# Patient Record
Sex: MALE | Race: BLACK OR AFRICAN AMERICAN | Employment: FULL TIME | URBAN - NONMETROPOLITAN AREA
[De-identification: names, ages, dates, MRNs, and addresses within clinical notes are randomized per-mention and may not be internally consistent; named-entity substitution may affect disease eponyms.]

---

## 2017-06-02 ENCOUNTER — HOSPITAL ENCOUNTER (EMERGENCY)
Age: 37
Discharge: HOME | End: 2017-06-02
Attending: INTERNAL MEDICINE
Payer: COMMERCIAL

## 2017-06-02 VITALS
RESPIRATION RATE: 16 BRPM | HEART RATE: 81 BPM | OXYGEN SATURATION: 100 % | HEIGHT: 73 IN | TEMPERATURE: 97.7 F | DIASTOLIC BLOOD PRESSURE: 78 MMHG | WEIGHT: 230 LBS | SYSTOLIC BLOOD PRESSURE: 139 MMHG | BODY MASS INDEX: 30.48 KG/M2

## 2017-06-02 DIAGNOSIS — K52.9 AGE (ACUTE GASTROENTERITIS): Primary | ICD-10-CM

## 2017-06-02 PROCEDURE — 99283 EMERGENCY DEPT VISIT LOW MDM: CPT

## 2017-06-02 PROCEDURE — 6360000002 HC RX W HCPCS: Performed by: INTERNAL MEDICINE

## 2017-06-02 RX ORDER — LISINOPRIL 20 MG/1
20 TABLET ORAL DAILY
Status: ON HOLD | COMMUNITY
End: 2017-12-24 | Stop reason: HOSPADM

## 2017-06-02 RX ORDER — ONDANSETRON 4 MG/1
2 TABLET, ORALLY DISINTEGRATING ORAL EVERY 4 HOURS PRN
Qty: 15 TABLET | Refills: 0 | Status: SHIPPED | OUTPATIENT
Start: 2017-06-02 | End: 2017-12-23

## 2017-06-02 RX ORDER — HYDROCHLOROTHIAZIDE 12.5 MG/1
12.5 CAPSULE, GELATIN COATED ORAL DAILY
Status: ON HOLD | COMMUNITY
End: 2017-12-24 | Stop reason: HOSPADM

## 2017-06-02 RX ORDER — ONDANSETRON 4 MG/1
4 TABLET, ORALLY DISINTEGRATING ORAL ONCE
Status: COMPLETED | OUTPATIENT
Start: 2017-06-02 | End: 2017-06-02

## 2017-06-02 RX ORDER — SIMVASTATIN 10 MG
10 TABLET ORAL NIGHTLY
Status: ON HOLD | COMMUNITY
End: 2017-12-24 | Stop reason: HOSPADM

## 2017-06-02 RX ADMIN — ONDANSETRON 4 MG: 4 TABLET, ORALLY DISINTEGRATING ORAL at 07:39

## 2017-06-02 ASSESSMENT — ENCOUNTER SYMPTOMS
CHEST TIGHTNESS: 0
DIARRHEA: 1
EYE DISCHARGE: 0
SORE THROAT: 0
ABDOMINAL PAIN: 0
BACK PAIN: 0
EYE PAIN: 0
SHORTNESS OF BREATH: 0
VOMITING: 0
COUGH: 0
NAUSEA: 1

## 2017-06-02 ASSESSMENT — PAIN SCALES - GENERAL: PAINLEVEL_OUTOF10: 5

## 2017-06-02 ASSESSMENT — PAIN DESCRIPTION - PAIN TYPE: TYPE: ACUTE PAIN

## 2017-06-02 ASSESSMENT — PAIN DESCRIPTION - LOCATION: LOCATION: ABDOMEN

## 2017-06-02 ASSESSMENT — PAIN DESCRIPTION - DESCRIPTORS: DESCRIPTORS: CRAMPING

## 2017-06-02 ASSESSMENT — PAIN DESCRIPTION - FREQUENCY: FREQUENCY: CONTINUOUS

## 2017-12-23 PROBLEM — I48.91 ATRIAL FIBRILLATION (HCC): Status: ACTIVE | Noted: 2017-12-23

## 2017-12-26 ENCOUNTER — TELEPHONE (OUTPATIENT)
Dept: CARDIOLOGY | Age: 37
End: 2017-12-26

## 2017-12-26 NOTE — TELEPHONE ENCOUNTER
Spoke with pt's wife and informed her of the pt's appointment on 1/26/18 at 815. Also, I informed her that Dr. Barrera Ulrich would like him to get an appointment with Dr. Kelsea Weiss to have a primary care provider.

## 2022-09-10 ENCOUNTER — APPOINTMENT (OUTPATIENT)
Dept: GENERAL RADIOLOGY | Age: 42
DRG: 201 | End: 2022-09-10
Payer: MEDICAID

## 2022-09-10 ENCOUNTER — APPOINTMENT (OUTPATIENT)
Dept: CT IMAGING | Age: 42
DRG: 201 | End: 2022-09-10
Payer: MEDICAID

## 2022-09-10 ENCOUNTER — HOSPITAL ENCOUNTER (INPATIENT)
Age: 42
LOS: 1 days | Discharge: HOME | DRG: 201 | End: 2022-09-12
Attending: EMERGENCY MEDICINE | Admitting: STUDENT IN AN ORGANIZED HEALTH CARE EDUCATION/TRAINING PROGRAM
Payer: MEDICAID

## 2022-09-10 DIAGNOSIS — I48.92 ATRIAL FLUTTER WITH RAPID VENTRICULAR RESPONSE (HCC): ICD-10-CM

## 2022-09-10 DIAGNOSIS — R06.00 DYSPNEA AND RESPIRATORY ABNORMALITIES: Primary | ICD-10-CM

## 2022-09-10 DIAGNOSIS — R06.89 DYSPNEA AND RESPIRATORY ABNORMALITIES: Primary | ICD-10-CM

## 2022-09-10 LAB
A/G RATIO: 1.3 (ref 1.1–2.2)
ALBUMIN SERPL-MCNC: 4.3 G/DL (ref 3.4–5)
ALP BLD-CCNC: 63 U/L (ref 40–129)
ALT SERPL-CCNC: 26 U/L (ref 10–40)
ANION GAP SERPL CALCULATED.3IONS-SCNC: 12 MMOL/L (ref 3–16)
AST SERPL-CCNC: 21 U/L (ref 15–37)
BASOPHILS ABSOLUTE: 0.1 K/UL (ref 0–0.2)
BASOPHILS RELATIVE PERCENT: 1.2 %
BILIRUB SERPL-MCNC: 0.4 MG/DL (ref 0–1)
BUN BLDV-MCNC: 15 MG/DL (ref 7–20)
CALCIUM SERPL-MCNC: 9.6 MG/DL (ref 8.3–10.6)
CHLORIDE BLD-SCNC: 106 MMOL/L (ref 99–110)
CO2: 23 MMOL/L (ref 21–32)
CREAT SERPL-MCNC: 1.4 MG/DL (ref 0.9–1.3)
EOSINOPHILS ABSOLUTE: 0.1 K/UL (ref 0–0.6)
EOSINOPHILS RELATIVE PERCENT: 1.7 %
GFR AFRICAN AMERICAN: >60
GFR NON-AFRICAN AMERICAN: 55
GLUCOSE BLD-MCNC: 97 MG/DL (ref 70–99)
HCT VFR BLD CALC: 46.5 % (ref 40.5–52.5)
HEMOGLOBIN: 15.4 G/DL (ref 13.5–17.5)
INR BLD: 0.96 (ref 0.87–1.14)
LYMPHOCYTES ABSOLUTE: 3.7 K/UL (ref 1–5.1)
LYMPHOCYTES RELATIVE PERCENT: 45.4 %
MCH RBC QN AUTO: 26.9 PG (ref 26–34)
MCHC RBC AUTO-ENTMCNC: 33.2 G/DL (ref 31–36)
MCV RBC AUTO: 81 FL (ref 80–100)
MONOCYTES ABSOLUTE: 0.6 K/UL (ref 0–1.3)
MONOCYTES RELATIVE PERCENT: 7.5 %
NEUTROPHILS ABSOLUTE: 3.6 K/UL (ref 1.7–7.7)
NEUTROPHILS RELATIVE PERCENT: 44.2 %
PDW BLD-RTO: 15.2 % (ref 12.4–15.4)
PLATELET # BLD: 127 K/UL (ref 135–450)
PMV BLD AUTO: 9.2 FL (ref 5–10.5)
POTASSIUM REFLEX MAGNESIUM: 3.9 MMOL/L (ref 3.5–5.1)
PRO-BNP: 10 PG/ML (ref 0–124)
PROTHROMBIN TIME: 12.7 SEC (ref 11.7–14.5)
RBC # BLD: 5.74 M/UL (ref 4.2–5.9)
SODIUM BLD-SCNC: 141 MMOL/L (ref 136–145)
TOTAL PROTEIN: 7.6 G/DL (ref 6.4–8.2)
TROPONIN: <0.01 NG/ML
TSH REFLEX FT4: 1.7 UIU/ML (ref 0.27–4.2)
WBC # BLD: 8.1 K/UL (ref 4–11)

## 2022-09-10 PROCEDURE — 96375 TX/PRO/DX INJ NEW DRUG ADDON: CPT

## 2022-09-10 PROCEDURE — 71260 CT THORAX DX C+: CPT | Performed by: NURSE PRACTITIONER

## 2022-09-10 PROCEDURE — 71046 X-RAY EXAM CHEST 2 VIEWS: CPT

## 2022-09-10 PROCEDURE — 2580000003 HC RX 258: Performed by: NURSE PRACTITIONER

## 2022-09-10 PROCEDURE — 96361 HYDRATE IV INFUSION ADD-ON: CPT

## 2022-09-10 PROCEDURE — 84443 ASSAY THYROID STIM HORMONE: CPT

## 2022-09-10 PROCEDURE — 6360000004 HC RX CONTRAST MEDICATION: Performed by: EMERGENCY MEDICINE

## 2022-09-10 PROCEDURE — 99285 EMERGENCY DEPT VISIT HI MDM: CPT

## 2022-09-10 PROCEDURE — 93005 ELECTROCARDIOGRAM TRACING: CPT | Performed by: EMERGENCY MEDICINE

## 2022-09-10 PROCEDURE — 80053 COMPREHEN METABOLIC PANEL: CPT

## 2022-09-10 PROCEDURE — 2500000003 HC RX 250 WO HCPCS: Performed by: NURSE PRACTITIONER

## 2022-09-10 PROCEDURE — 84484 ASSAY OF TROPONIN QUANT: CPT

## 2022-09-10 PROCEDURE — 83880 ASSAY OF NATRIURETIC PEPTIDE: CPT

## 2022-09-10 PROCEDURE — 85025 COMPLETE CBC W/AUTO DIFF WBC: CPT

## 2022-09-10 PROCEDURE — 96376 TX/PRO/DX INJ SAME DRUG ADON: CPT

## 2022-09-10 PROCEDURE — 85610 PROTHROMBIN TIME: CPT

## 2022-09-10 PROCEDURE — 96374 THER/PROPH/DIAG INJ IV PUSH: CPT

## 2022-09-10 RX ORDER — HYDROCHLOROTHIAZIDE 12.5 MG/1
12.5 TABLET ORAL DAILY
Status: ON HOLD | COMMUNITY
End: 2022-09-11

## 2022-09-10 RX ORDER — DILTIAZEM HYDROCHLORIDE 5 MG/ML
10 INJECTION INTRAVENOUS ONCE
Status: COMPLETED | OUTPATIENT
Start: 2022-09-10 | End: 2022-09-10

## 2022-09-10 RX ORDER — 0.9 % SODIUM CHLORIDE 0.9 %
1000 INTRAVENOUS SOLUTION INTRAVENOUS ONCE
Status: COMPLETED | OUTPATIENT
Start: 2022-09-10 | End: 2022-09-11

## 2022-09-10 RX ADMIN — DILTIAZEM HYDROCHLORIDE 10 MG: 5 INJECTION, SOLUTION INTRAVENOUS at 22:54

## 2022-09-10 RX ADMIN — IOPAMIDOL 75 ML: 755 INJECTION, SOLUTION INTRAVENOUS at 23:46

## 2022-09-10 RX ADMIN — DILTIAZEM HYDROCHLORIDE 10 MG: 5 INJECTION, SOLUTION INTRAVENOUS at 23:43

## 2022-09-10 RX ADMIN — SODIUM CHLORIDE 1000 ML: 9 INJECTION, SOLUTION INTRAVENOUS at 22:53

## 2022-09-10 ASSESSMENT — ENCOUNTER SYMPTOMS
DIARRHEA: 0
SORE THROAT: 0
BACK PAIN: 0
VOMITING: 0
WHEEZING: 0
SHORTNESS OF BREATH: 1
COUGH: 0
NAUSEA: 0
CHEST TIGHTNESS: 0
ABDOMINAL PAIN: 0
EYE PAIN: 0

## 2022-09-10 ASSESSMENT — PAIN - FUNCTIONAL ASSESSMENT: PAIN_FUNCTIONAL_ASSESSMENT: NONE - DENIES PAIN

## 2022-09-11 PROBLEM — I48.91 ATRIAL FIBRILLATION WITH RAPID VENTRICULAR RESPONSE (HCC): Status: ACTIVE | Noted: 2022-09-11

## 2022-09-11 PROBLEM — R29.818 SUSPECTED SLEEP APNEA: Status: ACTIVE | Noted: 2022-09-11

## 2022-09-11 PROBLEM — Z72.0 NICOTINE ABUSE: Status: ACTIVE | Noted: 2022-09-11

## 2022-09-11 LAB
A/G RATIO: 1.8 (ref 1.1–2.2)
ALBUMIN SERPL-MCNC: 3 G/DL (ref 3.4–5)
ALBUMIN SERPL-MCNC: 3.2 G/DL (ref 3.4–5)
ALP BLD-CCNC: 43 U/L (ref 40–129)
ALT SERPL-CCNC: 20 U/L (ref 10–40)
ANION GAP SERPL CALCULATED.3IONS-SCNC: 11 MMOL/L (ref 3–16)
ANION GAP SERPL CALCULATED.3IONS-SCNC: 9 MMOL/L (ref 3–16)
AST SERPL-CCNC: 14 U/L (ref 15–37)
BASOPHILS ABSOLUTE: 0 K/UL (ref 0–0.2)
BASOPHILS RELATIVE PERCENT: 0.8 %
BILIRUB SERPL-MCNC: 0.4 MG/DL (ref 0–1)
BUN BLDV-MCNC: 11 MG/DL (ref 7–20)
BUN BLDV-MCNC: 9 MG/DL (ref 7–20)
CALCIUM SERPL-MCNC: 6.8 MG/DL (ref 8.3–10.6)
CALCIUM SERPL-MCNC: 6.9 MG/DL (ref 8.3–10.6)
CHLORIDE BLD-SCNC: 110 MMOL/L (ref 99–110)
CHLORIDE BLD-SCNC: 113 MMOL/L (ref 99–110)
CO2: 17 MMOL/L (ref 21–32)
CO2: 17 MMOL/L (ref 21–32)
CREAT SERPL-MCNC: 0.7 MG/DL (ref 0.9–1.3)
CREAT SERPL-MCNC: 0.7 MG/DL (ref 0.9–1.3)
EKG ATRIAL RATE: 300 BPM
EKG DIAGNOSIS: NORMAL
EKG P AXIS: 250 DEGREES
EKG Q-T INTERVAL: 240 MS
EKG QRS DURATION: 82 MS
EKG QTC CALCULATION (BAZETT): 388 MS
EKG R AXIS: 68 DEGREES
EKG T AXIS: 2 DEGREES
EKG VENTRICULAR RATE: 157 BPM
EOSINOPHILS ABSOLUTE: 0.1 K/UL (ref 0–0.6)
EOSINOPHILS RELATIVE PERCENT: 1.5 %
GFR AFRICAN AMERICAN: >60
GFR AFRICAN AMERICAN: >60
GFR NON-AFRICAN AMERICAN: >60
GFR NON-AFRICAN AMERICAN: >60
GLUCOSE BLD-MCNC: 111 MG/DL (ref 70–99)
GLUCOSE BLD-MCNC: 117 MG/DL (ref 70–99)
HCT VFR BLD CALC: 40.8 % (ref 40.5–52.5)
HEMOGLOBIN: 13.6 G/DL (ref 13.5–17.5)
LYMPHOCYTES ABSOLUTE: 3 K/UL (ref 1–5.1)
LYMPHOCYTES RELATIVE PERCENT: 47.8 %
MAGNESIUM: 1.5 MG/DL (ref 1.8–2.4)
MAGNESIUM: 1.5 MG/DL (ref 1.8–2.4)
MCH RBC QN AUTO: 27.2 PG (ref 26–34)
MCHC RBC AUTO-ENTMCNC: 33.2 G/DL (ref 31–36)
MCV RBC AUTO: 81.8 FL (ref 80–100)
MONOCYTES ABSOLUTE: 0.5 K/UL (ref 0–1.3)
MONOCYTES RELATIVE PERCENT: 7.5 %
NEUTROPHILS ABSOLUTE: 2.6 K/UL (ref 1.7–7.7)
NEUTROPHILS RELATIVE PERCENT: 42.4 %
PDW BLD-RTO: 15.4 % (ref 12.4–15.4)
PHOSPHORUS: 2.2 MG/DL (ref 2.5–4.9)
PLATELET # BLD: 115 K/UL (ref 135–450)
PLATELET SLIDE REVIEW: ABNORMAL
PMV BLD AUTO: 10.2 FL (ref 5–10.5)
POTASSIUM REFLEX MAGNESIUM: 3.3 MMOL/L (ref 3.5–5.1)
POTASSIUM SERPL-SCNC: 3.4 MMOL/L (ref 3.5–5.1)
RBC # BLD: 4.99 M/UL (ref 4.2–5.9)
SLIDE REVIEW: ABNORMAL
SODIUM BLD-SCNC: 136 MMOL/L (ref 136–145)
SODIUM BLD-SCNC: 141 MMOL/L (ref 136–145)
TOTAL PROTEIN: 5 G/DL (ref 6.4–8.2)
WBC # BLD: 6.2 K/UL (ref 4–11)

## 2022-09-11 PROCEDURE — 85025 COMPLETE CBC W/AUTO DIFF WBC: CPT

## 2022-09-11 PROCEDURE — 80053 COMPREHEN METABOLIC PANEL: CPT

## 2022-09-11 PROCEDURE — 6370000000 HC RX 637 (ALT 250 FOR IP): Performed by: STUDENT IN AN ORGANIZED HEALTH CARE EDUCATION/TRAINING PROGRAM

## 2022-09-11 PROCEDURE — 2580000003 HC RX 258: Performed by: STUDENT IN AN ORGANIZED HEALTH CARE EDUCATION/TRAINING PROGRAM

## 2022-09-11 PROCEDURE — 93010 ELECTROCARDIOGRAM REPORT: CPT | Performed by: INTERNAL MEDICINE

## 2022-09-11 PROCEDURE — 36415 COLL VENOUS BLD VENIPUNCTURE: CPT

## 2022-09-11 PROCEDURE — 2060000000 HC ICU INTERMEDIATE R&B

## 2022-09-11 PROCEDURE — 2500000003 HC RX 250 WO HCPCS: Performed by: NURSE PRACTITIONER

## 2022-09-11 PROCEDURE — 99255 IP/OBS CONSLTJ NEW/EST HI 80: CPT | Performed by: INTERNAL MEDICINE

## 2022-09-11 PROCEDURE — 83735 ASSAY OF MAGNESIUM: CPT

## 2022-09-11 PROCEDURE — 2500000003 HC RX 250 WO HCPCS: Performed by: STUDENT IN AN ORGANIZED HEALTH CARE EDUCATION/TRAINING PROGRAM

## 2022-09-11 PROCEDURE — 6370000000 HC RX 637 (ALT 250 FOR IP): Performed by: INTERNAL MEDICINE

## 2022-09-11 RX ORDER — ENOXAPARIN SODIUM 100 MG/ML
30 INJECTION SUBCUTANEOUS 2 TIMES DAILY
Status: DISCONTINUED | OUTPATIENT
Start: 2022-09-11 | End: 2022-09-11

## 2022-09-11 RX ORDER — ENOXAPARIN SODIUM 100 MG/ML
40 INJECTION SUBCUTANEOUS DAILY
Status: DISCONTINUED | OUTPATIENT
Start: 2022-09-11 | End: 2022-09-11 | Stop reason: DRUGHIGH

## 2022-09-11 RX ORDER — SODIUM CHLORIDE 9 MG/ML
INJECTION, SOLUTION INTRAVENOUS CONTINUOUS
Status: DISCONTINUED | OUTPATIENT
Start: 2022-09-11 | End: 2022-09-11

## 2022-09-11 RX ORDER — ACETAMINOPHEN 650 MG/1
650 SUPPOSITORY RECTAL EVERY 6 HOURS PRN
Status: DISCONTINUED | OUTPATIENT
Start: 2022-09-11 | End: 2022-09-12 | Stop reason: HOSPADM

## 2022-09-11 RX ORDER — DILTIAZEM HYDROCHLORIDE 240 MG/1
240 CAPSULE, COATED, EXTENDED RELEASE ORAL DAILY
Status: DISCONTINUED | OUTPATIENT
Start: 2022-09-11 | End: 2022-09-12 | Stop reason: HOSPADM

## 2022-09-11 RX ORDER — METOPROLOL TARTRATE 5 MG/5ML
5 INJECTION INTRAVENOUS ONCE
Status: COMPLETED | OUTPATIENT
Start: 2022-09-11 | End: 2022-09-11

## 2022-09-11 RX ORDER — SODIUM CHLORIDE 9 MG/ML
INJECTION, SOLUTION INTRAVENOUS PRN
Status: DISCONTINUED | OUTPATIENT
Start: 2022-09-11 | End: 2022-09-12 | Stop reason: HOSPADM

## 2022-09-11 RX ORDER — SODIUM CHLORIDE 0.9 % (FLUSH) 0.9 %
5-40 SYRINGE (ML) INJECTION PRN
Status: DISCONTINUED | OUTPATIENT
Start: 2022-09-11 | End: 2022-09-12 | Stop reason: HOSPADM

## 2022-09-11 RX ORDER — ONDANSETRON 2 MG/ML
4 INJECTION INTRAMUSCULAR; INTRAVENOUS EVERY 6 HOURS PRN
Status: DISCONTINUED | OUTPATIENT
Start: 2022-09-11 | End: 2022-09-12 | Stop reason: HOSPADM

## 2022-09-11 RX ORDER — SODIUM CHLORIDE 0.9 % (FLUSH) 0.9 %
5-40 SYRINGE (ML) INJECTION EVERY 12 HOURS SCHEDULED
Status: DISCONTINUED | OUTPATIENT
Start: 2022-09-11 | End: 2022-09-12 | Stop reason: HOSPADM

## 2022-09-11 RX ORDER — ASPIRIN 81 MG/1
81 TABLET ORAL DAILY
Status: DISCONTINUED | OUTPATIENT
Start: 2022-09-11 | End: 2022-09-11

## 2022-09-11 RX ORDER — LISINOPRIL 10 MG/1
10 TABLET ORAL DAILY
Status: DISCONTINUED | OUTPATIENT
Start: 2022-09-11 | End: 2022-09-12 | Stop reason: HOSPADM

## 2022-09-11 RX ORDER — ACETAMINOPHEN 325 MG/1
650 TABLET ORAL EVERY 6 HOURS PRN
Status: DISCONTINUED | OUTPATIENT
Start: 2022-09-11 | End: 2022-09-12 | Stop reason: HOSPADM

## 2022-09-11 RX ADMIN — SODIUM CHLORIDE: 9 INJECTION, SOLUTION INTRAVENOUS at 02:30

## 2022-09-11 RX ADMIN — DILTIAZEM HYDROCHLORIDE 240 MG: 240 CAPSULE, COATED, EXTENDED RELEASE ORAL at 13:31

## 2022-09-11 RX ADMIN — METOPROLOL TARTRATE 12.5 MG: 25 TABLET, FILM COATED ORAL at 02:22

## 2022-09-11 RX ADMIN — METOPROLOL TARTRATE 12.5 MG: 25 TABLET, FILM COATED ORAL at 07:55

## 2022-09-11 RX ADMIN — DILTIAZEM HYDROCHLORIDE 2.5 MG/HR: 5 INJECTION INTRAVENOUS at 01:35

## 2022-09-11 RX ADMIN — LISINOPRIL 10 MG: 10 TABLET ORAL at 07:55

## 2022-09-11 RX ADMIN — Medication 10 ML: at 21:35

## 2022-09-11 RX ADMIN — APIXABAN 5 MG: 5 TABLET, FILM COATED ORAL at 21:35

## 2022-09-11 RX ADMIN — METOPROLOL TARTRATE 5 MG: 5 INJECTION INTRAVENOUS at 00:21

## 2022-09-11 RX ADMIN — ASPIRIN 81 MG: 81 TABLET, COATED ORAL at 07:55

## 2022-09-11 RX ADMIN — APIXABAN 5 MG: 5 TABLET, FILM COATED ORAL at 13:31

## 2022-09-11 ASSESSMENT — PAIN - FUNCTIONAL ASSESSMENT
PAIN_FUNCTIONAL_ASSESSMENT: NONE - DENIES PAIN
PAIN_FUNCTIONAL_ASSESSMENT: NONE - DENIES PAIN

## 2022-09-11 ASSESSMENT — PAIN SCALES - GENERAL
PAINLEVEL_OUTOF10: 0

## 2022-09-11 NOTE — CONSULTS
Referring Physician: Dr. Ortega Camejo  Reason for Consultation: \"Recurrence atrial fibrillation RVR\"  Chief Complaint: Palpitations      Subjective:   History of Present Illness:  Mariposa Glass is a 42 y.o. patient who presented to the hospital with complaints of palpitations. The patient has a known history of paroxysmal atrial fibrillation/flutter since 1/2018 per his report. He had the sudden onset of rapid palpitations with associated dyspnea and generalized sensation of feeling unwell. He knew instantly that he was back in atrial fibrillation. Oftentimes this sensation resolves without any particular intervention but was not improving and thus he sought medical attention. The ECG shows atrial flutter. On telemetry, appears to be more consistent with atrial fibrillation. Regardless he was started on a diltiazem drip and his heart rate improved. The patient is very quickly aware of when the heart rate is elevated but well controlled he generally says he feels okay. He denies associated chest pains. His wife is present bedside. He acknowledges that he likely has sleep apnea but has never been tested. In general, he denies exertional chest pain/pressure, PND, orthopnea, and lower extremity edema. Past Medical History:   has a past medical history of Atrial fibrillation ([address] Hyperlipidemia, and Hypertension. Surgical History:  Denies prior cardiac surgery. Social History:   reports that he has been smoking cigarettes. He has been smoking an average of .5 packs per day. He has never used smokeless tobacco. He reports current alcohol use. He reports current drug use. Drug: Marijuana Champ Cue). Family History:  family history includes High Blood Pressure in his father and mother. He was adopted. Home Medications:  Were reviewed and are listed in nursing record and/or below  Prior to Admission medications    Medication Sig Start Date End Date Taking?  Authorizing Provider   lisinopril (PRINIVIL;ZESTRIL) 10 MG tablet Take 1 tablet by mouth daily 7/14/18   RANJITH Guzman   aspirin 81 MG EC tablet Take 1 tablet by mouth daily 12/25/17   Falguni Franklin MD        CURRENT Medications:  dilTIAZem 125 mg in dextrose 5 % 125 mL infusion, Continuous  lisinopril (PRINIVIL;ZESTRIL) tablet 10 mg, Daily  aspirin EC tablet 81 mg, Daily  sodium chloride flush 0.9 % injection 5-40 mL, 2 times per day  sodium chloride flush 0.9 % injection 5-40 mL, PRN  0.9 % sodium chloride infusion, PRN  acetaminophen (TYLENOL) tablet 650 mg, Q6H PRN   Or  acetaminophen (TYLENOL) suppository 650 mg, Q6H PRN  perflutren lipid microspheres (DEFINITY) injection 1.65 mg, ONCE PRN  ondansetron (ZOFRAN) injection 4 mg, Q6H PRN  metoprolol tartrate (LOPRESSOR) tablet 12.5 mg, BID  enoxaparin Sodium (LOVENOX) injection 30 mg, BID  0.9 % sodium chloride infusion, Continuous    Allergies:  Bee venom and Pcn [penicillins]     Review of Systems:   Constitutional: no unanticipated weight loss. There's been no change in energy level, sleep pattern, or activity level. No fevers, chills. Eyes: No visual changes or diplopia. No scleral icterus. ENT: No Headaches, hearing loss or vertigo. No mouth sores or sore throat. Cardiovascular: as reviewed in HPI  Respiratory: No cough or wheezing, no sputum production. No hemoptysis. Gastrointestinal: No abdominal pain, appetite loss, blood in stools. No change in bowel or bladder habits. Genitourinary: No dysuria, trouble voiding, or hematuria. Musculoskeletal:  No gait disturbance, no joint complaints. Integumentary: No rash or pruritis. Neurological: No headache, diplopia, change in muscle strength, numbness or tingling. Psychiatric: No anxiety or depression. Endocrine: No temperature intolerance. No excessive thirst, fluid intake, or urination. No tremor. Hematologic/Lymphatic: No abnormal bruising or bleeding, blood clots or swollen lymph nodes.   Allergic/Immunologic: No nasal congestion or hives. Objective:   PHYSICAL EXAM:    Vitals:    09/11/22 1215   BP: 124/93   Pulse: 94   Resp: 17   Temp:    SpO2: 97    Weight: 253 lb 1.4 oz (114.8 kg)       General Appearance:  Alert, cooperative, no distress, appears stated age. Head:  Normocephalic, without obvious abnormality, atraumatic. Eyes:  Pupils equal and round. No scleral icterus. Mouth: Moist mucosa, no pharyngeal erythema. Nose: Nares normal. No drainage or sinus tenderness. Neck: Supple, symmetrical, trachea midline. No adenopathy. No tenderness/mass/nodules. No carotid bruit or elevated JVD. Lungs:   Clear to auscultation bilaterally, respirations unlabored. No wheeze, rales, or rhonchi. Chest Wall:  No tenderness or deformity. Heart:  Irrieg irreg with rate . S1/S2 normal. No murmur, rub, or gallop. Abdomen:   Soft, non-tender, bowel sounds active. Musculoskeletal: No muscle wasting or digital clubbing. Extremities: Extremities normal, atraumatic. No cyanosis or edema. Pulses: 2+ radial and carotid pulses, symmetric. Skin: No rashes or lesions. Pysch: Normal mood and affect. Alert and oriented x 4.    Neurologic: Normal gross motor and sensory exam.       Labs     CBC:   Lab Results   Component Value Date/Time    WBC 6.2 09/11/2022 11:01 AM    RBC 4.99 09/11/2022 11:01 AM    HGB 13.6 09/11/2022 11:01 AM    HCT 40.8 09/11/2022 11:01 AM    MCV 81.8 09/11/2022 11:01 AM    RDW 15.4 09/11/2022 11:01 AM     09/10/2022 10:40 PM     CMP:  Lab Results   Component Value Date/Time     09/11/2022 11:01 AM    K 3.4 09/11/2022 11:01 AM    K 3.9 09/10/2022 10:40 PM     09/11/2022 11:01 AM    CO2 17 09/11/2022 11:01 AM    BUN 11 09/11/2022 11:01 AM    CREATININE 0.7 09/11/2022 11:01 AM    GFRAA >60 09/11/2022 11:01 AM    AGRATIO 1.3 09/10/2022 10:40 PM    LABGLOM >60 09/11/2022 11:01 AM    GLUCOSE 111 09/11/2022 11:01 AM    PROT 7.6 09/10/2022 10:40 PM    CALCIUM 6.9 09/11/2022 11:01 AM BILITOT 0.4 09/10/2022 10:40 PM    ALKPHOS 63 09/10/2022 10:40 PM    AST 21 09/10/2022 10:40 PM    ALT 26 09/10/2022 10:40 PM     PT/INR:  No results found for: PTINR  HgBA1c:No results found for: LABA1C  Lab Results   Component Value Date    TROPONINI <0.01 09/10/2022       Cardiac Data     EKG: Personally interpreted. 9/10/2022. Atrial flutter predominantly in 2-1 AV block with premature or aberrantly conducted complexes. Echo: None on file. Telemetry: Personally interpreted. Atrial fibrillation/flutter. Assessment and Plan   1) Atrial fibrillation/flutter with a rapid ventricular response. Patient with known paroxysmal atrial fibrillation/flutter. [id number] score equals 1. Treatment options for atrial fibrillation/flutter discussed including rate control, risks and benefits of anticoagulation, antiarrhythmics, cardioversion, and possible ablation. Will transition from IV diltiazem to Cardizem CD. Discussed inpatient versus outpatient management. The patient is comfortable with outpatient follow-up. Start Eliquis 5 mg twice daily in anticipation of outpatient cardioversion. He seems more interested in pursuing an ablation. Will arrange outpatient consultation with the electrophysiologist and obtain an echocardiogram.    2) Essential hypertension. Controlled. Goal BP <130/80. Continue medical therapy. 3) Suspected sleep apnea. Patient's wife reports that he has episodes of apnea. Outpatient referral to sleep medicine for sleep study seems appropriate. 4) Nicotine Abuse. Encouraged smoking cessation but patient is in the contemplative stage. Overall, the problems requiring hospitalization are high in severity. Will sign off. Call with questions. Will arrange outpatient follow-up. Thank you for allowing us to participate in the care of Josemanuel Storm. Keri Arechiga.  [place]ams Monte, [address]  9/11/2022 12:44 PM

## 2022-09-11 NOTE — ED PROVIDER NOTES
[address] [facility]      Pt Name: Franck Hernandez  MRN: [id number]  T[birth year]gfneris 1980  Date of evaluation: 9/10/2022  [facility]  Chief Complaint   Patient presents with    Shortness of Breath     Patient is SOB when he was walking up the steps, hx of A fib and feels his heart is beating funny       I have fully participated in the care of Franck Hernandez and have had a face-to-face evaluation. I have reviewed and agree with all pertinent clinical information, and midlevel provider's history, and physical exam. I have also reviewed the labs, EKG, and imaging studies and treatment plan. I have also reviewed and agree with the medications, allergies and past medical history section for this [employer]. I agree with the diagnosis, and I concur. I wore personal protective equipment when I was in the room the entire time. This includes gloves, N95 mask, face shield, and a glove over my stethoscope for protection. Past Medical History:   Diagnosis Date    Hyperlipidemia     Hypertension        MDM:  Franck Hernandez is a 42 y.o. male who presents with fast heartbeat that started today. He has a history of atrial fibrillation but is not followed up for his treatment. He currently is not on any medications. However, he noticed fast heartbeat today. He states it comes and goes. He states it feels fast now. He has had chest pressure but no pain. He has been mildly short of breath. He denies any fevers or chills. Denies any coughing. Physical exam shows heart rate to be fast at 120s to 130s. It is irregular. Lungs are clear to auscultation equal bilaterally. Abdomen soft and nontender. Extremities there is no edema. Laboratory work revealed no cause for his symptoms. He was given Cardizem 10 mg IV and his heart rate came down to 108. His CT scan of his chest rule out pulmonary embolus is negative. Therefore, I spoke to the hospitalist who is admitting the patient to the hospital for further evaluation and treatment. Hospitalist was notified at [address] Vitals:    09/11/22 0026   BP: (!) 124/95   Pulse: (!) 106   Resp: 21   Temp:    SpO2: 99%       Lab results  Labs Reviewed   CBC WITH AUTO DIFFERENTIAL - Abnormal; Notable for the following components:       Result Value    Platelets 422 (*)     All other components within normal limits   COMPREHENSIVE METABOLIC PANEL W/ REFLEX TO MG FOR LOW K - Abnormal; Notable for the following components:    Creatinine 1.4 (*)     GFR Non- 55 (*)     All other components within normal limits   BRAIN NATRIURETIC PEPTIDE   TROPONIN   TSH WITH REFLEX TO FT4   PROTIME-INR   TSH WITH REFLEX TO FT4       Radiology results  CT CHEST PULMONARY EMBOLISM W CONTRAST   Final Result   No evidence of pulmonary embolism or acute pulmonary abnormality. XR CHEST (2 VW)   Final Result   Clear stable chest without acute cardiopulmonary process. Medications   dilTIAZem 125 mg in dextrose 5 % 125 mL infusion (has no administration in time range)   dilTIAZem injection 10 mg (10 mg IntraVENous Given 9/10/22 2254)   0.9 % sodium chloride bolus (1,000 mLs IntraVENous New Bag 9/10/22 2253)   dilTIAZem injection 10 mg (10 mg IntraVENous Given 9/10/22 2343)   iopamidol (ISOVUE-370) 76 % injection 75 mL (75 mLs IntraVENous Given 9/10/22 2346)   metoprolol (LOPRESSOR) injection 5 mg (5 mg IntraVENous Given 9/11/22 0021)       New Prescriptions    No medications on file       The patient's blood pressure was found to be elevated according to CMS/Medicare and the Affordable Care Act/ObamaCare criteria. Elevated blood pressure could occur because of pain or anxiety or other reasons and does not mean that they need to have their blood pressure treated or medications otherwise adjusted.  However, this could also be a sign that they will need to have their blood pressure treated or medications changed. The patient was instructed to follow up closely with their personal physician to have their blood pressure rechecked. The patient was instructed to take a list of recent blood pressure readings to their next visit with their personal physician. IMPRESSIONS:  1. Dyspnea and respiratory abnormalities    2.  Atrial flutter with rapid ventricular response ([facility] [address]                Brayden Stacy DO  09/11/22 0121

## 2022-09-11 NOTE — PROGRESS NOTES
Medication Reconciliation    List of medications patient is currently taking is complete. Source of information: 1.  Conversation with family at bedside                                      2. EPIC records      Allergies  Bee venom and Pcn [penicillins]

## 2022-09-11 NOTE — ED TRIAGE NOTES
Javier Salcido is a 42 y.o. male brought himself to the ER for eval of SOB when walking up the steps, the patient has a history of a fib and HTN. The patient is alert and oriented with an open and patent airway with a normal respiratory effort.

## 2022-09-11 NOTE — ED PROVIDER NOTES
[address]        Pt Name: Curt Umanzor  MRN: [id number]  Tiffanygfneris 1980  Date of evaluation: 9/10/2022  Provider: RMAY Wharton CNP  PCP: No primary care provider on file. Note Started: 11:26 PM EDT        I have seen and evaluated this patient with my supervising physician Fredy Cuba, [facility]       Chief Complaint   Patient presents with    Shortness of Breath     Patient is SOB when he was walking up the steps, hx of A fib and feels his heart is beating funny       HISTORY OF PRESENT ILLNESS   (Location, Timing/Onset, Context/Setting, Quality, Duration, Modifying Factors, Severity, Associated Signs and Symptoms)  Note limiting factors. Chief Complaint: Shortness of breath and palpitation    Curt Umanzor is a 42 y.o. male who presents to the emergency department with complaints of palpitations that has associated shortness of breath. States that he has a history of paroxysmal A. fib. States that he was walking up some stairs when he felt the palpitations and shortness of breath start. He is not on any rate control medications. He states that he was supposed to follow-up with cardiologist when they first diagnosed him with A. fib but he never did. He is on antihypertensives and a baby aspirin only. Started just prior to arrival.  No chest pain. Denies any associated diaphoresis nausea or vomiting. Came to the emergency department for further evaluation and treatment. Nursing Notes were all reviewed and agreed with or any disagreements were addressed in the HPI. REVIEW OF SYSTEMS    (2-9 systems for level 4, 10 or more for level 5)     Review of Systems   Constitutional:  Negative for chills, diaphoresis and fever. HENT:  Negative for congestion and sore throat. Eyes:  Negative for pain and visual disturbance. Respiratory:  Positive for shortness of breath.  Negative for cough, chest tightness and wheezing. Cardiovascular:  Positive for palpitations. Negative for chest pain and leg swelling. Gastrointestinal:  Negative for abdominal pain, diarrhea, nausea and vomiting. Genitourinary:  Negative for dysuria and hematuria. Musculoskeletal:  Negative for back pain, myalgias, neck pain and neck stiffness. Skin:  Negative for rash and wound. Neurological:  Negative for dizziness and light-headedness. All other systems reviewed and are negative. Positives and Pertinent negatives as per HPI. Except as noted above in the ROS, all other systems were reviewed and negative. PAST MEDICAL HISTORY     Past Medical History:   Diagnosis Date    Atrial fibrillation ([facility] [address]     Hyperlipidemia     Hypertension          SURGICAL HISTORY   History reviewed. No pertinent surgical history.       [address]       Current Discharge Medication List        CONTINUE these medications which have NOT CHANGED    Details   lisinopril (PRINIVIL;ZESTRIL) 10 MG tablet Take 1 tablet by mouth daily  Qty: 14 tablet, Refills: 0      aspirin 81 MG EC tablet Take 1 tablet by mouth daily  Qty: 30 tablet, Refills: 3               ALLERGIES     Bee venom and Pcn [penicillins]    FAMILYHISTORY       Family History   Adopted: Yes   Problem Relation Age of Onset    High Blood Pressure Mother     High Blood Pressure Father           SOCIAL HISTORY       Social History     Tobacco Use    Smoking status: Every Day     Packs/day: 0.50     Types: Cigarettes    Smokeless tobacco: Never   Vaping Use    Vaping Use: Never used   Substance Use Topics    Alcohol use: Yes     Comment: socially    Drug use: Yes     Types: Marijuana (Weed)       SCREENINGS    [place] Coma Scale  Eye Opening: Spontaneous  Best Verbal Response: Oriented  Best Motor Response: Obeys commands  Fredy Coma Scale Score: 15        PHYSICAL EXAM    (up to 7 for level 4, 8 or more for level 5)     ED Triage Vitals [09/10/22 2150]   BP Temp Temp Source Heart Rate Resp SpO2 Height Weight   (!) 162/124 97.8 °F (36.6 °C) Oral (!) 113 20 100 % 6' 1\" (1.854 m) 252 lb 6.8 oz (114.5 kg)       Physical Exam  Vitals and nursing note reviewed. Constitutional:       General: He is not in acute distress. Appearance: Normal appearance. He is not ill-appearing, toxic-appearing or diaphoretic. HENT:      Head: Normocephalic and atraumatic. No raccoon eyes, Tran's sign, right periorbital erythema or left periorbital erythema. Right Ear: Hearing and external ear normal.      Left Ear: Hearing and external ear normal.      Nose: Nose normal. No laceration, nasal tenderness, mucosal edema, congestion or rhinorrhea. Right Nostril: No epistaxis. Left Nostril: No epistaxis. Mouth/Throat:      Lips: Pink. No lesions. Mouth: Mucous membranes are moist.      Tongue: No lesions. Tongue does not deviate from midline. Pharynx: Oropharynx is clear. Uvula midline. No pharyngeal swelling, oropharyngeal exudate, posterior oropharyngeal erythema or uvula swelling. Tonsils: No tonsillar exudate or tonsillar abscesses. Eyes:      General: Lids are normal.         Right eye: No discharge. Left eye: No discharge. Extraocular Movements: Extraocular movements intact. Pupils: Pupils are equal, round, and reactive to light. Neck:      Trachea: Phonation normal. No abnormal tracheal secretions or tracheal deviation. Comments: No meningismus   Cardiovascular:      Rate and Rhythm: Tachycardia present. Rhythm irregular. Pulses: Normal pulses. Heart sounds: Normal heart sounds. No murmur heard. No friction rub. No gallop. Comments: Appears to be a flutter on the EKG. A flutter with PVCs  Pulmonary:      Effort: Pulmonary effort is normal. No tachypnea, bradypnea or respiratory distress. Breath sounds: Normal breath sounds. No stridor. No decreased breath sounds, wheezing, rhonchi or rales.       Comments: Able to speak in full sentences without any difficulties or dyspnea noted  Abdominal:      General: Bowel sounds are normal. There is no distension. Palpations: Abdomen is soft. Tenderness: There is no abdominal tenderness. There is no guarding or rebound. Musculoskeletal:         General: Normal range of motion. Cervical back: Full passive range of motion without pain, normal range of motion and neck supple. No rigidity. No spinous process tenderness or muscular tenderness. Right lower leg: No tenderness. No edema. Left lower leg: No tenderness. No edema. Lymphadenopathy:      Cervical: No cervical adenopathy. Skin:     General: Skin is warm and dry. Capillary Refill: Capillary refill takes less than 2 seconds. Neurological:      General: No focal deficit present. Mental Status: He is alert and oriented to person, place, and time. GCS: GCS eye subscore is 4. GCS verbal subscore is 5. GCS motor subscore is 6. Cranial Nerves: Cranial nerves are intact. Sensory: Sensation is intact. No sensory deficit. Motor: Motor function is intact. Coordination: Romberg sign negative. Gait: Gait is intact.    Psychiatric:         Mood and Affect: Mood normal.         Behavior: Behavior normal.       DIAGNOSTIC RESULTS   LABS:    Labs Reviewed   CBC WITH AUTO DIFFERENTIAL - Abnormal; Notable for the following components:       Result Value    Platelets 982 (*)     All other components within normal limits   COMPREHENSIVE METABOLIC PANEL W/ REFLEX TO MG FOR LOW K - Abnormal; Notable for the following components:    Creatinine 1.4 (*)     GFR Non- 55 (*)     All other components within normal limits   RENAL FUNCTION PANEL - Abnormal; Notable for the following components:    Potassium 3.4 (*)     CO2 17 (*)     Glucose 111 (*)     Creatinine 0.7 (*)     Calcium 6.9 (*)     Phosphorus 2.2 (*)     Albumin 3.0 (*)     All other components within normal limits   MAGNESIUM - Abnormal; Notable for the following components:    Magnesium 1.50 (*)     All other components within normal limits   BRAIN NATRIURETIC PEPTIDE   TROPONIN   TSH WITH REFLEX TO FT4   PROTIME-INR   CBC WITH AUTO DIFFERENTIAL   COMPREHENSIVE METABOLIC PANEL W/ REFLEX TO MG FOR LOW K       When ordered only abnormal lab results are displayed. All other labs were within normal range or not returned as of this dictation. EKG: When ordered, EKG's are interpreted by the Emergency Department Physician in the absence of a cardiologist.  Please see their note for interpretation of EKG. RADIOLOGY:   Non-plain film images such as CT, Ultrasound and MRI are read by the radiologist. Plain radiographic images are visualized and preliminarily interpreted by the ED Provider with the below findings:        Interpretation per the Radiologist below, if available at the time of this note:    CT CHEST PULMONARY EMBOLISM W CONTRAST   Final Result   No evidence of pulmonary embolism or acute pulmonary abnormality. XR CHEST (2 VW)   Final Result   Clear stable chest without acute cardiopulmonary process. XR CHEST (2 VW)    Result Date: 9/10/2022  EXAMINATION: TWO XRAY VIEWS OF THE CHEST 9/10/2022 10:08 pm COMPARISON: 07/14/2018 HISTORY: ORDERING SYSTEM PROVIDED HISTORY: palpitations TECHNOLOGIST PROVIDED HISTORY: Reason for exam:->palpitations Reason for Exam: palpitations FINDINGS: Cardiac mediastinal silhouettes appear within normal limits for size. Pulmonary vascularity is normal.  No acute osseous abnormality. No focal infiltrate, pleural effusion, pneumothorax or pulmonary edema. Clear stable chest without acute cardiopulmonary process. PROCEDURES   Unless otherwise noted below, none     Procedures    CRITICAL CARE TIME   CRITICAL CARE NOTE:    Ketan Adler CNP am the primary clinician of record.     There was a high probability of clinically significant life-threatening deterioration of the patient's condition requiring my urgent intervention. Total critical care time was at least 30 minutes. This includes vital sign monitoring, pulse oximetry monitoring, telemetry monitoring, clinical response to the IV medications, reviewing the nursing notes, consultation time, dictation/documentation time, and interpretation of the labwork. This excludes any separately billable procedures performed. Not concomitant critical care time.   Also includes multiple reevaluations of the patient's heart rate in hemodynamic status after multiple medication dosages      CONSULTS:  IP CONSULT TO CARDIOLOGY      EMERGENCY DEPARTMENT COURSE and DIFFERENTIAL DIAGNOSIS/MDM:   Vitals:    Vitals:    09/11/22 0348 09/11/22 0615 09/11/22 0754 09/11/22 1215   BP: 130/82  (!) 124/93 (!) 140/93   Pulse: (!) 109 98 98 94   Resp: 16 17 16 17   Temp: 97.9 °F (36.6 °C)  97.9 °F (36.6 °C) 98.1 °F (36.7 °C)   TempSrc: Oral  Oral Oral   SpO2: 96%  97% 97%   Weight: 253 lb 1.4 oz (114.8 kg)      Height: 6' 1\" (1.854 m)          Patient was given the following medications:  Medications   lisinopril (PRINIVIL;ZESTRIL) tablet 10 mg (10 mg Oral Given 9/11/22 0755)   sodium chloride flush 0.9 % injection 5-40 mL (5 mLs IntraVENous Not Given 9/11/22 0904)   sodium chloride flush 0.9 % injection 5-40 mL (has no administration in time range)   0.9 % sodium chloride infusion (has no administration in time range)   acetaminophen (TYLENOL) tablet 650 mg (has no administration in time range)     Or   acetaminophen (TYLENOL) suppository 650 mg (has no administration in time range)   perflutren lipid microspheres (DEFINITY) injection 1.65 mg (has no administration in time range)   ondansetron (ZOFRAN) injection 4 mg (has no administration in time range)   apixaban (ELIQUIS) tablet 5 mg (5 mg Oral Given 9/11/22 1331)   dilTIAZem (CARDIZEM CD) extended release capsule 240 mg (240 mg Oral Given 9/11/22 1331)   dilTIAZem injection 10 mg (10 mg IntraVENous Given 9/10/22 4098)   0.9 % sodium chloride bolus (0 mLs IntraVENous Stopped 9/11/22 0135)   dilTIAZem injection 10 mg (10 mg IntraVENous Given 9/10/22 2343)   iopamidol (ISOVUE-370) 76 % injection 75 mL (75 mLs IntraVENous Given 9/10/22 2346)   metoprolol (LOPRESSOR) injection 5 mg (5 mg IntraVENous Given 9/11/22 0021)   metoprolol tartrate (LOPRESSOR) tablet 12.5 mg (12.5 mg Oral Given 9/11/22 0222)         Is this patient to be included in the SEP-1 Core Measure due to severe sepsis or septic shock? No   Exclusion criteria - the patient is NOT to be included for SEP-1 Core Measure due to:  2+ SIRS criteria are not met    MDM: See HPI and above for full presentation physical exam.  Differential diagnoses included but not limited to irregular heart rate, thyroid dysfunction, dehydration, anemia, PE, ACS, A. fib/flutter, other    Blood work shows no leukocytosis. No anemia. No significant electrolyte derangements. Slight elevation in his creatinine from 0.9-1.4. BUN is within defined limits at 15. GFR is greater than 60. We will give a liter of IV fluids. No coagulopathy. Is not on anticoagulants or rate control medications. Troponin negative. TSH within defined limits. BNP unremarkable. CT and plain films as read by the radiologist as above. EKG shows an a flutter with RVR. Ordered a bolus dose of diltiazem. When desired effect was not achieved with rate control another bolus dose was ordered. This did still did not control the patient's rate. Therefore a dose of metoprolol was given. Per our new protocol, if desired rate control is not achieved after 2 doses of antiarrhythmic/rate control medications a drip is recommended. At this time my shift is ending. However likely will need a rate control medication drip and admission.   Handoff was given to my attending with high likelihood of the patient being admitted for rate control for A. fib/flutter with RVR via the hospitalist service    FINAL IMPRESSION      1. Dyspnea and respiratory abnormalities    2. Atrial flutter with rapid ventricular response ([facility] [address]          DISPOSITION/PLAN   DISPOSITION Admitted 09/11/2022 02:09:58 AM      PATIENT REFERRED TO:  No follow-up provider specified.     DISCHARGE MEDICATIONS:  Current Discharge Medication List          DISCONTINUED MEDICATIONS:  Current Discharge Medication List        STOP taking these medications       hydroCHLOROthiazide (HYDRODIURIL) 12.5 MG tablet Comments:   Reason for Stopping:         diltiazem (CARDIZEM CD) 180 MG extended release capsule Comments:   Reason for Stopping:                      (Please note that portions of this note were completed with a voice recognition program.  Efforts were made to edit the dictations but occasionally words are mis-transcribed.)    RAMY Brizuela - CNP (electronically signed)           RAMY Brizuela - MILA  09/11/22 0400

## 2022-09-11 NOTE — PROGRESS NOTES
Pt arrived via stretcher from ED to room 5280 at 0348. Heart monitor connected and verified with [facility]. VS, assessment, and admission complete. 4 eyes assessment complete. Pt oriented to unit and room. Call light and bedside table in reach. Wife at bedside. All questions answered. Pt resting quietly in bed with no complaints or voiced needs at this time. Will continue to monitor.  Electronically signed by Burak Ying RN on 9/11/2022 at 4:33 AM

## 2022-09-11 NOTE — H&P
Hospital Medicine History & Physical      PCP: No primary care provider on file. Date of Admission: 9/10/2022    Date of Service: Pt seen/examined on 9/11/2022 and admitted to inpatient    Chief Complaint: Palpitations, fatigue      History Of Present Illness: The patient is a 42 y.o. male with a past ministry of hyperlipidemia and hypertension and previous occurrence of A. fib 4 years ago for which she was on medications inpatient but never followed up with cardiology and is currently not on any rate controlling medications but is still taking aspirin who presents to [facility] with concern that since yesterday he started having increasing intermittent palpitations and it especially became more persistent over the course of the evening today before coming to the ED. He felt the palpitations occurring mainly tonight and he was not doing anything strenuous at the time. He felt increasingly fatigued and his wife did note that he was seeming very off and possibly very tired appearing but not entirely lethargic or to the point of tenderness. Patient notes that he does not regularly drink alcohol but did indulge in 2 shots of tequila the previous night prior to tonight for which he started noticing the palpitation symptoms. He does remark that previously when he had his A. fib episode 4 years ago it was revolving around taking alcohol so he feels as though it might be a contributing factor. He does also feel somewhat dehydrated since he was not eating or drinking very much over the course of the day. He denies other recent symptoms however of fever, chills, dizziness, syncope, chest pain, shortness of breath, dysuria, blood in urine/stool/sputum, nausea/vomiting/diarrhea/abdominal pain, dysuria, leg swelling.     Past Medical History:        Diagnosis Date Hyperlipidemia     Hypertension        Past Surgical History:    History reviewed. No pertinent surgical history. Medications Prior to Admission:    Prior to Admission medications    Medication Sig Start Date End Date Taking? Authorizing Provider   hydroCHLOROthiazide (HYDRODIURIL) 12.5 MG tablet Take 12.5 mg by mouth daily  Patient not taking: Reported on 9/11/2022   Yes Historical Provider, MD   lisinopril (PRINIVIL;ZESTRIL) 10 MG tablet Take 1 tablet by mouth daily 7/14/18   RANJITH Cramer   aspirin 81 MG EC tablet Take 1 tablet by mouth daily 12/25/17   Anup Manzanares MD       Allergies:  Bee venom and Pcn [penicillins]    Social History:  The patient currently lives home    TOBACCO:   reports that he has been smoking cigarettes. He has been smoking an average of .5 packs per day. He has never used smokeless tobacco.  ETOH:   reports current alcohol use. Family History:  Reviewed in detail and negative for DM, Early CAD, Cancer, CVA. Positive as follows: Adopted: Yes   Problem Relation Age of Onset    High Blood Pressure Mother     High Blood Pressure Father        REVIEW OF SYSTEMS:    and as noted in the HPI. All other systems reviewed and negative. PHYSICAL EXAM:    /82   Pulse 98   Temp 97.9 °F (36.6 °C) (Oral)   Resp 17   Ht 6' 1\" (1.854 m)   Wt 253 lb 1.4 oz (114.8 kg)   SpO2 96%   BMI 33.39 kg/m²     General appearance: Alert and oriented x4, no acute respiratory distress, still feeling some slight fatigue but overall feeling better  HEENT Normal cephalic, atraumatic without obvious deformity. Pupils equal, round, and reactive to light. Extra ocular muscles intact.   Mildly dry mucous membranes, anicteric sclera  Neck: Supple, no JVD  Lungs: Clear breath sounds bilaterally  Heart: Somewhat irregularly irregular rhythm, at times tachycardic but does seem to control itself intermittently, no murmurs detected  Abdomen: Soft, nontender, nondistended, active bowel sounds  Extremities: No edema  Skin: No rashes  Neurologic: Grossly intact neurologically  Mental status: Alert, oriented, thought content appropriate. Capillary Refill: Acceptable  < 3 seconds  Peripheral Pulses: +3 Easily felt, not easily obliterated with pressure    09/11/22 0019  CT CHEST PULMONARY EMBOLISM W CONTRAST   Performed: 09/10/22 2346  Final        Impression: No evidence of pulmonary embolism or acute pulmonary abnormality. 09/10/22 2226  XR CHEST (2 VW)   Performed: 09/10/22 2215  Final  Specimen: Chest        Impression: Clear stable chest without acute cardiopulmonary process.              CBC   Recent Labs     09/10/22  2240   WBC 8.1   HGB 15.4   HCT 46.5   *      RENAL  Recent Labs     09/10/22  2240      K 3.9      CO2 23   BUN 15   CREATININE 1.4*     LFT'S  Recent Labs     09/10/22  2240   AST 21   ALT 26   BILITOT 0.4   ALKPHOS 63     COAG  Recent Labs     09/10/22  2240   INR 0.96     CARDIAC ENZYMES  Recent Labs     09/10/22  2240   TROPONINI <0.01       U/A:  No results found for: NITRITE, COLORU, WBCUA, RBCUA, MUCUS, BACTERIA, CLARITYU, SPECGRAV, LEUKOCYTESUR, BLOODU, GLUCOSEU, AMORPHOUS    ABG  No results found for: UZS9SKY, BEART, Z1DXNPNN, PHART, THGBART, GFQ7RPG, PO2ART, JMS9HGK        Active Hospital Problems    Diagnosis Date Noted    Atrial fibrillation with rapid ventricular response ([address] [I48.91] 09/11/2022     Priority: Medium    Hyperlipidemia [E78.5]      Priority: Medium    Hypertension [I10]      Priority: Medium    Atrial fibrillation with RVR ([address] [I48.91] 12/23/2017   KARSTEN      PHYSICIANS CERTIFICATION:    I certify that Adriana Swanson is expected to be hospitalized for greater than 2 midnights based on the following assessment and plan:      ASSESSMENT/PLAN:  Started patient on Cardizem IV infusion  Start patient on oral metoprolol x1 dose of 12.5 mg, continue on 12.5 mg twice daily in the morning  Patient with a [id number] score of 1, started patient on just aspirin, Lovenox prophylactic dose for DVT during admission  Restart patient's home medications  Hydrating patient with 100/h of normal saline x10 hours  Repeat echo in the morning  Cardiology consult for recurrence of A. Fib  Repeat labs daily  Counseled patient on limiting or removing alcohol, he suspects that potentially alcohol use could be triggering his A. fib    DVT Prophylaxis: Subcu Lovenox  Diet: ADULT DIET; Regular; Low Sodium (2 gm)  Code Status: Full Code  PT/OT Eval Status: Ambulatory    Dispo -pending clinical course       Boyd Bates, DO    Thank you No primary care provider on file. for the opportunity to be involved in this patient's care. If you have any questions or concerns please feel free to contact me at [phone].

## 2022-09-11 NOTE — PROGRESS NOTES
Seen.  Admitted by my partner early this morning. Seen by me later in the afternoon. Patient feels okay. He is still having A. fib. He is symptomatic and feels weak and tired. Await cardiology evaluation. Transthoracic echo ordered for tomorrow. May require cardioversion versus ablation.   Will defer to cardiology judgment      Electronically signed by Tucker Mahoney MD on 9/11/2022 at 3:30 PM

## 2022-09-11 NOTE — PROGRESS NOTES
Cardizem drip is off. Patient received oral cardizem. Baseline heart rate 110's. Heart is jumping up to 140-150's with frequent PVCs since drip is off. Dr Win Diehl notified.

## 2022-09-12 VITALS
OXYGEN SATURATION: 98 % | DIASTOLIC BLOOD PRESSURE: 91 MMHG | HEIGHT: 73 IN | SYSTOLIC BLOOD PRESSURE: 132 MMHG | TEMPERATURE: 98.3 F | RESPIRATION RATE: 14 BRPM | HEART RATE: 62 BPM | BODY MASS INDEX: 33.22 KG/M2 | WEIGHT: 250.66 LBS

## 2022-09-12 LAB
A/G RATIO: 1.5 (ref 1.1–2.2)
ALBUMIN SERPL-MCNC: 4.4 G/DL (ref 3.4–5)
ALP BLD-CCNC: 58 U/L (ref 40–129)
ALT SERPL-CCNC: 23 U/L (ref 10–40)
ANION GAP SERPL CALCULATED.3IONS-SCNC: 13 MMOL/L (ref 3–16)
AST SERPL-CCNC: 16 U/L (ref 15–37)
BASOPHILS ABSOLUTE: 0 K/UL (ref 0–0.2)
BASOPHILS RELATIVE PERCENT: 0.6 %
BILIRUB SERPL-MCNC: 1.1 MG/DL (ref 0–1)
BUN BLDV-MCNC: 12 MG/DL (ref 7–20)
CALCIUM SERPL-MCNC: 9.9 MG/DL (ref 8.3–10.6)
CHLORIDE BLD-SCNC: 100 MMOL/L (ref 99–110)
CO2: 25 MMOL/L (ref 21–32)
CREAT SERPL-MCNC: 1 MG/DL (ref 0.9–1.3)
EOSINOPHILS ABSOLUTE: 0.1 K/UL (ref 0–0.6)
EOSINOPHILS RELATIVE PERCENT: 1.1 %
GFR AFRICAN AMERICAN: >60
GFR NON-AFRICAN AMERICAN: >60
GLUCOSE BLD-MCNC: 97 MG/DL (ref 70–99)
HCT VFR BLD CALC: 45.6 % (ref 40.5–52.5)
HEMOGLOBIN: 15.1 G/DL (ref 13.5–17.5)
LV EF: 58 %
LVEF MODALITY: NORMAL
LYMPHOCYTES ABSOLUTE: 2.9 K/UL (ref 1–5.1)
LYMPHOCYTES RELATIVE PERCENT: 48.9 %
MAGNESIUM: 2 MG/DL (ref 1.8–2.4)
MCH RBC QN AUTO: 27.1 PG (ref 26–34)
MCHC RBC AUTO-ENTMCNC: 33.2 G/DL (ref 31–36)
MCV RBC AUTO: 81.5 FL (ref 80–100)
MONOCYTES ABSOLUTE: 0.4 K/UL (ref 0–1.3)
MONOCYTES RELATIVE PERCENT: 7.1 %
NEUTROPHILS ABSOLUTE: 2.5 K/UL (ref 1.7–7.7)
NEUTROPHILS RELATIVE PERCENT: 42.3 %
PDW BLD-RTO: 15.2 % (ref 12.4–15.4)
PHOSPHORUS: 2.8 MG/DL (ref 2.5–4.9)
PLATELET # BLD: 112 K/UL (ref 135–450)
PMV BLD AUTO: 10.4 FL (ref 5–10.5)
POTASSIUM REFLEX MAGNESIUM: 4.2 MMOL/L (ref 3.5–5.1)
POTASSIUM SERPL-SCNC: 4.2 MMOL/L (ref 3.5–5.1)
RBC # BLD: 5.59 M/UL (ref 4.2–5.9)
SODIUM BLD-SCNC: 138 MMOL/L (ref 136–145)
TOTAL PROTEIN: 7.3 G/DL (ref 6.4–8.2)
WBC # BLD: 5.9 K/UL (ref 4–11)

## 2022-09-12 PROCEDURE — 83735 ASSAY OF MAGNESIUM: CPT

## 2022-09-12 PROCEDURE — 2580000003 HC RX 258: Performed by: STUDENT IN AN ORGANIZED HEALTH CARE EDUCATION/TRAINING PROGRAM

## 2022-09-12 PROCEDURE — 6370000000 HC RX 637 (ALT 250 FOR IP): Performed by: STUDENT IN AN ORGANIZED HEALTH CARE EDUCATION/TRAINING PROGRAM

## 2022-09-12 PROCEDURE — 85025 COMPLETE CBC W/AUTO DIFF WBC: CPT

## 2022-09-12 PROCEDURE — 93306 TTE W/DOPPLER COMPLETE: CPT

## 2022-09-12 PROCEDURE — 36415 COLL VENOUS BLD VENIPUNCTURE: CPT

## 2022-09-12 PROCEDURE — 6370000000 HC RX 637 (ALT 250 FOR IP): Performed by: INTERNAL MEDICINE

## 2022-09-12 PROCEDURE — 80053 COMPREHEN METABOLIC PANEL: CPT

## 2022-09-12 RX ORDER — DILTIAZEM HYDROCHLORIDE 240 MG/1
240 CAPSULE, COATED, EXTENDED RELEASE ORAL DAILY
Qty: 30 CAPSULE | Refills: 3 | Status: SHIPPED | OUTPATIENT
Start: 2022-09-13

## 2022-09-12 RX ADMIN — APIXABAN 5 MG: 5 TABLET, FILM COATED ORAL at 09:30

## 2022-09-12 RX ADMIN — LISINOPRIL 10 MG: 10 TABLET ORAL at 09:30

## 2022-09-12 RX ADMIN — DILTIAZEM HYDROCHLORIDE 240 MG: 240 CAPSULE, COATED, EXTENDED RELEASE ORAL at 09:30

## 2022-09-12 RX ADMIN — Medication 10 ML: at 09:31

## 2022-09-12 ASSESSMENT — PAIN SCALES - GENERAL
PAINLEVEL_OUTOF10: 0
PAINLEVEL_OUTOF10: 0

## 2022-09-12 NOTE — CARE COORDINATION
INITIAL ASSESSMENT AND DISCHARGE SUMMARY   09/12/22 1347   Service Assessment   Patient Orientation Alert and Oriented   Cognition Alert   History Provided By Patient   Primary Caregiver Self   PCP Verified by CM No  (PCP list provided to patient)   Prior Functional Level Independent in ADLs/IADLs   Current Functional Level Independent in ADLs/IADLs   Can patient return to prior living arrangement Yes   Ability to make needs known: Good   Financial Resources Medicaid   Social/Functional History   Lives With Spouse   Type of Home Apartment   Home Layout One level   Receives Help From Family   ADL Assistance Independent   Homemaking Assistance Independent   Ambulation Assistance Independent   Transfer Assistance Independent   Active  No   Patient's  Info wife transports   [id number] Discharge None   [address] Provided? No   Mode of Transport at Discharge Other (see comment)  (wife to transport)   Confirm Follow Up Transport Self   Condition of Participation: Discharge Planning   The Plan for Transition of Care is related to the following treatment goals: home with family support   Patient from home with wife; independent at baseline. Patient requested a PCP list as he does not currently have care established. Patient education provided on how to make follow up appointment, no questions answered. Patient denies additional discharge needs at this time. Family to transport home at time of discharge.     Electronically signed by Mercedes Delong RN on 9/12/2022 at 1:53 PM

## 2022-09-12 NOTE — PLAN OF CARE
Problem: Discharge Planning  Goal: Discharge to home or other facility with appropriate resources  Outcome: Progressing  Flowsheets  Taken 9/11/2022 0406  Discharge to home or other facility with appropriate resources: Identify barriers to discharge with patient and caregiver  Taken 9/11/2022 0348  Discharge to home or other facility with appropriate resources: Identify barriers to discharge with patient and caregiver     Problem: ABCDS Injury Assessment  Goal: Absence of physical injury  Outcome: Progressing  Flowsheets (Taken 9/11/2022 0443)  Absence of Physical Injury: Implement safety measures based on patient assessment     Problem: Respiratory - Adult  Goal: Achieves optimal ventilation and oxygenation  Flowsheets (Taken 9/11/2022 0443)  Achieves optimal ventilation and oxygenation:   Assess for changes in respiratory status   Assess for changes in mentation and behavior   Position to facilitate oxygenation and minimize respiratory effort   Oxygen supplementation based on oxygen saturation or arterial blood gases   Encourage broncho-pulmonary hygiene including cough, deep breathe, incentive spirometry   Assess and instruct to report shortness of breath or any respiratory difficulty     Problem: Cardiovascular - Adult  Goal: Maintains optimal cardiac output and hemodynamic stability  Flowsheets (Taken 9/11/2022 0443)  Maintains optimal cardiac output and hemodynamic stability:   Monitor blood pressure and heart rate   Monitor urine output and notify Licensed Independent Practitioner for values outside of normal range   Assess for signs of decreased cardiac output  Goal: Absence of cardiac dysrhythmias or at baseline  Flowsheets (Taken 9/11/2022 0443)  Absence of cardiac dysrhythmias or at baseline:   Monitor cardiac rate and rhythm   Assess for signs of decreased cardiac output   Administer antiarrhythmia medication and electrolyte replacement as ordered
Problem: Discharge Planning  Goal: Discharge to home or other facility with appropriate resources  Outcome: Progressing  Flowsheets (Taken 9/11/2022 2019)  Discharge to home or other facility with appropriate resources: Identify barriers to discharge with patient and caregiver     Problem: ABCDS Injury Assessment  Goal: Absence of physical injury  Outcome: Progressing  Flowsheets (Taken 9/12/2022 0219)  Absence of Physical Injury: Implement safety measures based on patient assessment     Problem: Respiratory - Adult  Goal: Achieves optimal ventilation and oxygenation  Outcome: Progressing  Flowsheets (Taken 9/11/2022 2019)  Achieves optimal ventilation and oxygenation:   Assess for changes in respiratory status   Assess for changes in mentation and behavior   Position to facilitate oxygenation and minimize respiratory effort   Oxygen supplementation based on oxygen saturation or arterial blood gases   Encourage broncho-pulmonary hygiene including cough, deep breathe, incentive spirometry   Assess and instruct to report shortness of breath or any respiratory difficulty     Problem: Cardiovascular - Adult  Goal: Maintains optimal cardiac output and hemodynamic stability  Outcome: Progressing  Flowsheets (Taken 9/11/2022 2019)  Maintains optimal cardiac output and hemodynamic stability:   Monitor blood pressure and heart rate   Monitor urine output and notify Licensed Independent Practitioner for values outside of normal range   Assess for signs of decreased cardiac output  Goal: Absence of cardiac dysrhythmias or at baseline  Outcome: Progressing  Flowsheets (Taken 9/11/2022 2019)  Absence of cardiac dysrhythmias or at baseline:   Monitor cardiac rate and rhythm   Assess for signs of decreased cardiac output   Administer antiarrhythmia medication and electrolyte replacement as ordered     Problem: Pain  Goal: Verbalizes/displays adequate comfort level or baseline comfort level  Outcome: Progressing
Assess for signs of decreased cardiac output  Goal: Absence of cardiac dysrhythmias or at baseline  9/11/2022 0959 by Roberto Cullen RN  Outcome: Progressing  9/11/2022 0443 by Mike Kat RN  Flowsheets (Taken 9/11/2022 9640)  Absence of cardiac dysrhythmias or at baseline:   Monitor cardiac rate and rhythm   Assess for signs of decreased cardiac output   Administer antiarrhythmia medication and electrolyte replacement as ordered

## 2022-09-12 NOTE — DISCHARGE INSTR - COC
Continuity of Care Form    Patient Name: Josemanuel Storm   :  1980  MRN:  [id number]    Admit date:  9/10/2022  Discharge date:  ***    Code Status Order: Full Code   Advance Directives:     Admitting Physician:  Regino Bosworth, DO  PCP: No primary care provider on file. Discharging Nurse: [facility] Unit/Room#: C1Y-9446/5280-01  Discharging Unit Phone Number: ***    Emergency Contact:   Extended Emergency Contact Information  Primary Emergency Contact: Charla Parsonsoway [address] Phone: [phone]  Mobile Phone: [phone]  Relation: Spouse  Secondary Emergency Contact: Carlos Dunham  Address: [address]           [facility] [address]  Home Phone: [phone]  Relation: Parent    Past Surgical History:  History reviewed. No pertinent surgical history. Immunization History: There is no immunization history on file for this patient.     Active Problems:  Patient Active Problem List   Diagnosis Code    Atrial fibrillation with RVR ([facility]) I48.91    Hyperlipidemia E78.5    Essential hypertension I10    Atrial fibrillation with rapid ventricular response ([facility]) I48.91    Suspected sleep apnea R29.818    Nicotine abuse Z72.0       Isolation/Infection:   Isolation            No Isolation          Patient Infection Status       None to display            Nurse Assessment:  Last Vital Signs: BP (!) 132/91   Pulse 62   Temp 98.3 °F (36.8 °C) (Oral)   Resp 14   Ht 6' 1\" (1.854 m)   Wt 250 lb 10.6 oz (113.7 kg)   SpO2 98%   BMI 33.07 kg/m²     Last documented pain score (0-10 scale): Pain Level: 0  Last Weight:   Wt Readings from Last 1 Encounters:   22 250 lb 10.6 oz (113.7 kg)     Mental Status:  {IP PT MENTAL STATUS:}    IV Access:  { JA IV ACCESS:308840436}    Nursing Mobility/ADLs:  Walking   {CHP DME GHJN:602635960}  Transfer  {CHP DME BQIS:731592504}  Bathing  {CHP DME ULTV:399885208}  Dressing  {CHP DME NTJP:633036509}  Toileting  {CHP DME BRQJ:113859317}  Feeding  {P DME PTBC:611294492}  Med Admin  {P DME XYQL:763108302}  Med Delivery   { JA MED Delivery:046511634}    Wound Care Documentation and Therapy:        Elimination:  Continence: Bowel: {YES / RB:54444}  Bladder: {YES / GB:62255}  Urinary Catheter: {Urinary Catheter:977829988}   Colostomy/Ileostomy/Ileal Conduit: {YES / XS:41259}       Date of Last BM: ***    Intake/Output Summary (Last 24 hours) at 2022 1220  Last data filed at 2022 0528  Gross per 24 hour   Intake 600 ml   Output 550 ml   Net 50 ml     I/O last 3 completed shifts: In: 1540.6 [P.O.:1200;  I.V.:340.6]  Out: 800 [Urine:800]    Safety Concerns:     [address] Safety Concerns:598719616}    Impairments/Disabilities:      [address] Impairments/Disabilities:980651342}    Nutrition Therapy:  Current Nutrition Therapy:   [address] Diet List:410324222}    Routes of Feeding: {[facility] DME Other Feedings:771337392}  Liquids: {Slp liquid thickness:63448}  Daily Fluid Restriction: {P DME Yes amt example:901442190}  Last Modified Barium Swallow with Video (Video Swallowing Test): {Done Not Done [facility]:742423909}    Treatments at the Time of Hospital Discharge:   Respiratory Treatments: ***  Oxygen Therapy:  {Therapy; copd oxygen:60857}  Ventilator:    { CC Vent THONG:010010151}    Rehab Therapies: {THERAPEUTIC INTERVENTION:5077739670}  Weight Bearing Status/Restrictions: [address] Weight Bearin}  Other Medical Equipment (for information only, NOT a DME order):  {EQUIPMENT:205540089}  Other Treatments: ***    Patient's personal belongings (please select all that are sent with patient):  {[facility] DME Belongings:546861864}    RN SIGNATURE:  {Esignature:092323399}    CASE MANAGEMENT/SOCIAL WORK SECTION    Inpatient Status Date: ***    Readmission Risk Assessment Score:  Readmission Risk              Risk of Unplanned Readmission:  10           Discharging to Facility/ Agency   Name:   Address:  Phone:  Fax:    Dialysis Facility (if applicable)   Name:  Address:  Dialysis Schedule:  Phone:  Fax:    / signature: {Esignature:416260431}    PHYSICIAN SECTION    Prognosis: {Prognosis:8029199149}    Condition at Discharge: [address] Patient Condition:047242547}    Rehab Potential (if transferring to Rehab): {Prognosis:2283545195}    Recommended Labs or Other Treatments After Discharge: ***    Physician Certification: I certify the above information and transfer of Cinda Rocha  is necessary for the continuing treatment of the diagnosis listed and that he requires {Admit to Appropriate Level of Care:05537} for {GREATER/LESS:407060496} 30 days.      Update Admission H&P: {CHP DME Changes in RPJVK:425951856}    PHYSICIAN SIGNATURE:  {Esignature:734978632}

## 2022-09-12 NOTE — PROGRESS NOTES
Pt discharged to home with wife to transport. Discharge education and documentation complete. All questions answered. IV removed without complications. All belongings sent home with patient. Pt and wife to  medications from retail pharmacy on the way out. Refused wheelchair - ambulates well independently.

## 2022-09-20 NOTE — PROGRESS NOTES
Cardiac Electrophysiology Consultation   Date: 9/21/2022   Reason for Consultation: atrial flutter / atrial fibrillation  Consult Requesting Physician: No primary care provider on file. Chief Complaint:   Chief Complaint   Patient presents with    Follow-up     aflutter        HPI: Fredy Franco is a 42 y.o. patient with a history of atrial fibrillation/ flutter first diagnosed in 2018 per patient report. First seen on EKG 9/10/2022. He presented to the [facility] ED on 9/11/2022 reporting symptoms of shortness of breath with activity and palpitations. EKG showed atrial flutter with variable A-V block with premature ventricular or aberrantly conducted complexes, v-rate 157 bpm. He received two bolus doses of Cardizem with no improvement in v-rates, so he was given two bolus doses of Metoprolol, rate remained elevated so he was started on a Cardizem gtt. He was started on Eliquis 5 mg BID for a [id number]-CFEn Score 1 (HTN). Today, he presents to office for hospital follow up for evaluation of atrial flutter and atrial fibrillation. He states prior to his hospitalization that last time he had it was a year ago. He states drinking alcohol is a clear trigger for his atrial fibrillation. He states that every time he drinks he will get atrial fibrillation the next day. He states he was first diagnosed with atrial fibrillation in 2018. He reports symptoms of shortness of breath and palpitations and HYMAN when in atrial fibrillation. He states that if he drinks he will have sleep apnea and he wife tells him that he stop breathing but only occurs when drinks ETOH. Past Medical History:   Diagnosis Date    Atrial fibrillation ([address]     Hyperlipidemia     Hypertension       No past surgical history on file. Allergies: Allergies   Allergen Reactions    Bee Venom Hives    Pcn [Penicillins] Hives       Medication:   Prior to Admission medications    Medication Sig Start Date End Date Taking?  Authorizing Provider   dilTIAZem (CARDIZEM CD) 240 MG extended release capsule Take 1 capsule by mouth daily 9/13/22  Yes Treva Rubin MD   apixaban (ELIQUIS) 5 MG TABS tablet Take 1 tablet by mouth 2 times daily for 14 days 9/12/22 9/26/22 Yes Treva Rubin MD   lisinopril (PRINIVIL;ZESTRIL) 10 MG tablet Take 1 tablet by mouth daily 7/14/18  Yes RANJITH Guzman   aspirin 81 MG EC tablet Take 1 tablet by mouth daily 12/25/17 9/12/22  Falguni Franklin MD       Social History:   reports that he has been smoking cigarettes. He has been smoking an average of .5 packs per day. He has never used smokeless tobacco. He reports current alcohol use. He reports current drug use. Drug: Marijuana Candiss Littler). Family History:  family history includes High Blood Pressure in his father and mother. He was adopted. Reviewed. Denies family history of sudden cardiac death, arrhythmia, premature CAD    Review of System:  Pertinent positive and negatives are in the HPI, the rest are negative. Physical Examination:  /70 (Site: Left Upper Arm, Position: Sitting)   Pulse 77   Ht 6' 1\" (1.854 m)   Wt 254 lb (115.2 kg)   SpO2 97%   BMI 33.51 kg/m²      Constitutional: Oriented. No distress. Head: Normocephalic and atraumatic. Mouth/Throat: Oropharynx is clear and moist.   Eyes: Conjunctivae normal. EOM are normal.   Neck: Normal range of motion. Neck supple. No rigidity. No JVD present. Cardiovascular: Normal rate, regular rhythm, S1&S2 and intact distal pulses. Pulmonary/Chest: Bilateral respiratory sounds. No wheezes. No rhonchi. Abdominal: Soft. Bowel sounds present. No distension, No tenderness. Musculoskeletal: No tenderness. No edema    Lymphadenopathy: Has no cervical adenopathy. Neurological: Alert and oriented. Cranial nerve appears intact, No Gross deficit   Skin: Skin is warm and dry. No rash noted. Psychiatric: Has a normal mood, affect and behavior     Labs:  Reviewed.      ECG: reviewed, Sinus rhythm with v-rate of 76 bpm with QRS duration 96 ms. No ventricular pre-excitation, or QT prolongation. Studies:   1. Event monitor: n/a      2. Echo: 9/12/2022  Summary  Normal left ventricle size and systolic function with an estimated ejection fraction of 55-60%. No regional wall motion abnormalities are seen. There is mildly increased left ventricular wall thickness. Normal diastolic function. The right ventricle is not well visualized but appears grossly normal in size and function. Mild mitral regurgitation. Trivial tricuspid regurgitation  LA volume/Index: 61 ml /26 ml/m2    3. Stress Test:  n/a      4. Cath: n/a    I independently reviewed the ECG, MCOT, echocardiogram, stress test, and coronary angiography/PCI results and used them for my plan of care. [id number] Score for Atrial Fibrillation Stroke Risk   Risk   Factors  Component Value   C CHF No 0   H HTN Yes 1   A2 Age >= 76 No,  (42 y.o.) 0   D DM No 0   S2 Prior Stroke/TIA No 0   V Vascular Disease No 0   A Age 74-69 No,  (42 y.o.) 0   Sc Sex male 0    [id number]  Score  1   Score last updated 9/20/22 3:83 PM EDT    Click here for a link to the UpToDate guideline \"Atrial Fibrillation: Anticoagulation therapy to prevent embolization    Disclaimer: Risk Score calculation is dependent on accuracy of patient problem list and past encounter diagnosis. Procedures:  1. N/a    Assessment/Plan:     Atrial fibrillation  - First seen on EKG 12/23/2017. - Symptomatic with palpitations and HYMAN.   -  ETOH trigger. Typical atrial flutter  - First noted on EKG 9/1/2022 @  21:45:10.  - Patient reports he was first diagnosed in 2018. - He has a [id number]-WEXe Score 1  (HTN)   - Continue Eliquis 5 mg BID for thromboembolic risk reduction.  - Tolerating well no signs or symptoms of abnormal bruising or bleeding.  - According to the ACC/HRS guidelines, with a [id number] score of 1, he is not  indicated for [address] [place] [address] at this time.    Stop Eliquis today.   Start ASA 81 mg daily. - Symptomatic with palpitations and HYMAN.  -  ETOH trigger.      - TSH 1.70 9/10/2022. Mr. Jun Hinds has paroxysmal atrial fibrillation and typical flutter with a clear trigger of alcohol from what he has observed. There is a clear association between alcohol and arrhythmia. We discussed pathophysiology of atrial fibrillation and treatment as noted below. For now will do a conservative approach where he will quit drinking. If has recurrence, he prefers to proceed with ablation over being on medications. Givne a low thromboembolic risk, we discussed either continuing anticoagulation or ASA. Pt prefers being off [address]. However, if he is to undergo ablation later on, he would need to be on anticoagulation for 3 months post ablation.     - Afib risk factors including age, HTN, obesity, inactivity and JANETTE were discussed with patient. Risk factor modification recommended      - Treatment options including cardioversion, rate control strategy, antiarrhythmics, anticoagulation and possible ablation were discussed with patient. Risks, benefits and alternative of each treatment options were explained. All questions answered                          ~ Information given regarding ablation for pt to review                          ~ The risks, benefits and alternatives of the ablation procedure were discussed with the patient.  The risks including, but not limited to, the risks of bleeding, infection, radiation exposure, injury to vascular, cardiac and surrounding structures (including pneumothorax), stroke, cardiac perforation, tamponade, need for emergent open heart surgery, need for pacemaker implantation, Injury to the phrenic nerve, injury to the esophagus, myocardial infarction and death were discussed in detail.                                       - I explained the success rate considering possible need for a second procedure is about 60-80% and with addition of anti arrhythmics is higher - Patient  would like to abstain for ETOH use and if reoccurs will consider ablation at that time. Essential hypertension  - at goal <130/80.  - Continue diltiazem and lisinopril/    Follow up with RAMY Lomeli CNP in 6 months. Thank you for allowing me to participate in the care of Lindy Mueller. All questions and concerns were addressed to the patient/family. Alternatives to my treatment were discussed. This note was scribed in the presence of Revonda Boas, MD by Laurie Peralta RN. Physician attestation: The scribe's documentation has been prepared under my direction and has been personally reviewed by me in its entirety. I confirm that the note above reflects all work, treatment, procedures, and medical decision making performed by me.      Revonda Boas, MD  Cardiac Electrophysiology  [facility]

## 2022-09-21 ENCOUNTER — OFFICE VISIT (OUTPATIENT)
Dept: CARDIOLOGY CLINIC | Age: 42
End: 2022-09-21
Payer: MEDICAID

## 2022-09-21 VITALS
OXYGEN SATURATION: 97 % | HEIGHT: 73 IN | SYSTOLIC BLOOD PRESSURE: 116 MMHG | HEART RATE: 77 BPM | DIASTOLIC BLOOD PRESSURE: 70 MMHG | WEIGHT: 254 LBS | BODY MASS INDEX: 33.66 KG/M2

## 2022-09-21 DIAGNOSIS — I48.91 ATRIAL FIBRILLATION, UNSPECIFIED TYPE (HCC): Primary | ICD-10-CM

## 2022-09-21 PROCEDURE — 99205 OFFICE O/P NEW HI 60 MIN: CPT | Performed by: INTERNAL MEDICINE

## 2022-09-21 PROCEDURE — 93000 ELECTROCARDIOGRAM COMPLETE: CPT | Performed by: INTERNAL MEDICINE

## 2022-09-21 NOTE — PROGRESS NOTES
Physician Progress Note      PATIENT:               Rayray Zhao  CSN #:                  [id number]  :                       1980  ADMIT DATE:       9/10/2022 9:38 PM  [address] [place] DATE:        2022 3:40 PM  RESPONDING  PROVIDER #:        Melanie Tony MD          QUERY TEXT:    Patient admitted with A fib and is maintained on Eliquis. If possible, please   document in progress notes and discharge summary if you are evaluating and/or   treating any of the following: The medical record reflects the following:  Risk Factors: A fib  Clinical Indicators: H&P \" Atrial fibrillation with rapid ventricular response   \"  Treatment: Eliquis, Cardizem, Cardiology consult and telemetry monitoring  Options provided:  -- Secondary hypercoagulable state in a patient with atrial fibrillation  -- Other - I will add my own diagnosis  -- Disagree - Not applicable / Not valid  -- Disagree - Clinically unable to determine / Unknown  -- Refer to Clinical Documentation Reviewer    PROVIDER RESPONSE TEXT:    This patient has secondary hypercoagulable state related to atrial   fibrillation.     Query created by: Bruna Barbosa on 2022 1:09 PM      Electronically signed by:  Melanie Tony MD 2022 2:20 PM

## 2022-09-30 ENCOUNTER — TELEPHONE (OUTPATIENT)
Dept: CARDIOLOGY CLINIC | Age: 42
End: 2022-09-30

## 2022-09-30 NOTE — TELEPHONE ENCOUNTER
Medication Refill    Medication needing refilled:dilTIAZem (CARDIZEM CD)      Dosage of the medication:240 MG extended release capsule       How are you taking this medication (QD, BID, TID, QID, PRN):Take 1 capsule by mouth daily    30 or 90 day supply called in: 30 day supply    When will you run out of your medication: already out    Which Pharmacy are we sending the medication to?:    Before Monday please send to:   [address] [place] [address], [address] [place] [address] [phone] - F [phone]   [facility] [address] [address]   Phone:  [phone]  Fax:  [phone]    After Monday please send to: [address] [address]   [phone]

## 2022-11-01 NOTE — DISCHARGE SUMMARY
Hospital Medicine Discharge Summary    [facility]      Patient's PCP: No primary care provider on file. Admit Date: 9/10/2022     Discharge Date: 9/12/2022    Admitting Physician: Yohana Montano DO     Discharge Physician: Renetta Champagne MD     Discharge Diagnoses: Active Hospital Problems    Diagnosis Date Noted    Atrial fibrillation with rapid ventricular response ([address] [I48.91] 09/11/2022     Priority: Medium    Suspected sleep apnea [R29.818] 09/11/2022     Priority: Medium    Nicotine abuse [Z72.0] 09/11/2022     Priority: Medium    Hyperlipidemia [E78.5]      Priority: Medium    Essential hypertension [I10]      Priority: Medium    Atrial fibrillation with RVR ([address] [I48.91] 12/23/2017       The patient was seen and examined on day of discharge and this discharge summary is in conjunction with any daily progress note from day of discharge. Condition at discharge - stable    Hospital Course: patient seen and evaluated on the day of discharge. Patient informed about following up with appointments. Patient verbalized understanding for follow-up appointments. The patient and / or the family were informed of the results of tests, a time was given to answer questions, a plan was proposed and they agreed with plan. Medical reconciliation performed. Patient discharged stable condition. On the date of discharge, the patient reported feeling stable. The patient was found to not be in any acute distress, with vital signs within normal limits, and no new abnormalities on physical examination. Further, the patient expressed appropriate understanding of, and agreement with, the discharge recommendations, medications, and plan. Atrial fibrillation  - First seen on EKG 12/23/2017. - Symptomatic with palpitations and HYMAN.              -  ETOH trigger.       Typical atrial flutter  - First noted on EKG 9/1/2022 @  21:45:10.  - Patient reports he was first diagnosed in 2018. - He has a [id number] Score 1  (HTN)    - Continue Eliquis 5 mg BID for thromboembolic risk reduction.  - Tolerating well no signs or symptoms of abnormal bruising or bleeding.  - According to the ACC/HRS guidelines, with a [id number] score of 1, he is not  indicated for [facility] at this time. Stop Eliquis today. Start ASA 81 mg daily. - Symptomatic with palpitations and HYMAN.  -  ETOH trigger.                  - TSH 1.70 9/10/2022. Mr. Ortega Buenrostro has paroxysmal atrial fibrillation and typical flutter with a clear trigger of alcohol from what he has observed. There is a clear association between alcohol and arrhythmia. We discussed pathophysiology of atrial fibrillation and treatment as noted below. For now will do a conservative approach where he will quit drinking. If has recurrence, he prefers to proceed with ablation over being on medications. Givne a low thromboembolic risk, we discussed either continuing anticoagulation or ASA. Pt prefers being off [facility] [address]. However, if he is to undergo ablation later on, he would need to be on anticoagulation for 3 months post ablation. Patient was started on eliquis and Cardizem, patient was evaluated by EP, patient reported improvement in symptoms and wishes to be discharged, patient to f/u with EP as OP, patient was given f/u information as well. Exam:     BP (!) 132/91   Pulse 62   Temp 98.3 °F (36.8 °C) (Oral)   Resp 14   Ht 6' 1\" (1.854 m)   Wt 250 lb 10.6 oz (113.7 kg)   SpO2 98%   BMI 33.07 kg/m²     General appearance: No apparent distress  HEENT:  Conjunctivae/corneas clear. Neck: Supple, No jugular venous distention. Respiratory:  Normal respiratory effort. Clear to auscultation, bilaterally without Rales/Wheezes/Rhonchi. Cardiovascular: Regular rate and rhythm with normal S1/S2 without murmurs, rubs or gallops. Abdomen: Soft, non-tender, non-distended, normal bowel sounds.   Musculoskelatal: No clubbing, cyanosis or edema bilaterally. Skin: Skin color, texture, turgor normal.   Neurologic: no focal neurologic deficits. grossly non-focal.  Psychiatric: Alert and oriented, normal mood    Consults:     IP CONSULT TO CARDIOLOGY      Code Status:  Prior    Activity: activity as tolerated    Labs: For convenience and continuity at follow-up the following most recent labs are provided:      CBC:    Lab Results   Component Value Date/Time    WBC 5.9 09/12/2022 10:58 AM    HGB 15.1 09/12/2022 10:58 AM    HCT 45.6 09/12/2022 10:58 AM     09/12/2022 10:58 AM       Renal:    Lab Results   Component Value Date/Time     09/12/2022 10:58 AM    K 4.2 09/12/2022 10:58 AM    K 4.2 09/12/2022 10:58 AM     09/12/2022 10:58 AM    CO2 25 09/12/2022 10:58 AM    BUN 12 09/12/2022 10:58 AM    CREATININE 1.0 09/12/2022 10:58 AM    CALCIUM 9.9 09/12/2022 10:58 AM    PHOS 2.8 09/12/2022 10:58 AM       Discharge Medications:     Discharge Medication List as of 9/12/2022  3:10 PM             Details   apixaban (ELIQUIS) 5 MG TABS tablet Take 1 tablet by mouth 2 times daily for 14 days, Disp-28 tablet, R-0Normal                Details   dilTIAZem (CARDIZEM CD) 240 MG extended release capsule Take 1 capsule by mouth daily, Disp-30 capsule, R-3Normal                Details   lisinopril (PRINIVIL;ZESTRIL) 10 MG tablet Take 1 tablet by mouth daily, Disp-14 tablet, R-0Print             Time Spent on discharge is more than 30 mints in the examination, evaluation, counseling and review of medications and discharge plan. Signed:    Idris Prajapati MD   10/31/2022      Thank you No primary care provider on file. for the opportunity to be involved in this patient's care. If you have any questions or concerns please feel free to contact me at [phone].

## 2023-03-20 PROBLEM — I48.3 TYPICAL ATRIAL FLUTTER (HCC): Status: ACTIVE | Noted: 2023-03-20

## 2023-03-20 PROBLEM — I48.0 PAF (PAROXYSMAL ATRIAL FIBRILLATION) (HCC): Status: ACTIVE | Noted: 2023-03-20

## 2023-03-20 PROBLEM — I48.91 ATRIAL FIBRILLATION WITH RAPID VENTRICULAR RESPONSE (HCC): Status: RESOLVED | Noted: 2022-09-11 | Resolved: 2023-03-20

## 2023-03-20 PROBLEM — I48.91 ATRIAL FIBRILLATION WITH RVR (HCC): Status: RESOLVED | Noted: 2017-12-23 | Resolved: 2023-03-20

## 2025-01-15 ENCOUNTER — HOSPITAL ENCOUNTER (EMERGENCY)
Age: 45
Discharge: LEFT BEFORE DISCHARGE | End: 2025-01-15
Attending: EMERGENCY MEDICINE
Payer: MEDICAID

## 2025-01-15 VITALS
SYSTOLIC BLOOD PRESSURE: 152 MMHG | BODY MASS INDEX: 32.27 KG/M2 | WEIGHT: 244.6 LBS | HEART RATE: 87 BPM | DIASTOLIC BLOOD PRESSURE: 100 MMHG | RESPIRATION RATE: 18 BRPM | TEMPERATURE: 97.8 F | OXYGEN SATURATION: 99 %

## 2025-01-15 LAB
FLUAV RNA RESP QL NAA+PROBE: NOT DETECTED
FLUBV RNA RESP QL NAA+PROBE: NOT DETECTED
SARS-COV-2 RNA RESP QL NAA+PROBE: NOT DETECTED

## 2025-01-15 PROCEDURE — 4500000002 HC ER NO CHARGE

## 2025-01-15 PROCEDURE — 87636 SARSCOV2 & INF A&B AMP PRB: CPT

## 2025-01-15 ASSESSMENT — PAIN - FUNCTIONAL ASSESSMENT: PAIN_FUNCTIONAL_ASSESSMENT: 0-10
